# Patient Record
Sex: FEMALE | URBAN - METROPOLITAN AREA
[De-identification: names, ages, dates, MRNs, and addresses within clinical notes are randomized per-mention and may not be internally consistent; named-entity substitution may affect disease eponyms.]

---

## 2020-08-21 ENCOUNTER — APPOINTMENT (RX ONLY)
Dept: URBAN - METROPOLITAN AREA CLINIC 95 | Facility: CLINIC | Age: 70
Setting detail: DERMATOLOGY
End: 2020-08-21

## 2020-08-21 DIAGNOSIS — D485 NEOPLASM OF UNCERTAIN BEHAVIOR OF SKIN: ICD-10-CM

## 2020-08-21 PROBLEM — D48.5 NEOPLASM OF UNCERTAIN BEHAVIOR OF SKIN: Status: ACTIVE | Noted: 2020-08-21

## 2020-08-21 PROCEDURE — ? COUNSELING

## 2020-08-21 PROCEDURE — ? ADDITIONAL NOTES

## 2020-08-21 PROCEDURE — 99201: CPT

## 2020-08-21 PROCEDURE — ? PHOTO-DOCUMENTATION

## 2020-08-21 ASSESSMENT — LOCATION DETAILED DESCRIPTION DERM
LOCATION DETAILED: LEFT UPPER CUTANEOUS LIP
LOCATION DETAILED: PERIUNGUAL SKIN RIGHT THUMB
LOCATION DETAILED: RIGHT INDEX FINGERTIP
LOCATION DETAILED: GENITALIA
LOCATION DETAILED: LEFT MEDIAL BUTTOCK
LOCATION DETAILED: LEFT INDEX FINGERTIP
LOCATION DETAILED: RIGHT BUTTOCK

## 2020-08-21 ASSESSMENT — LOCATION SIMPLE DESCRIPTION DERM
LOCATION SIMPLE: LEFT LIP
LOCATION SIMPLE: LEFT BUTTOCK
LOCATION SIMPLE: GENITALIA
LOCATION SIMPLE: LEFT INDEX FINGER
LOCATION SIMPLE: RIGHT THUMB
LOCATION SIMPLE: RIGHT INDEX FINGER
LOCATION SIMPLE: RIGHT BUTTOCK

## 2020-08-21 ASSESSMENT — PAIN INTENSITY VAS: HOW INTENSE IS YOUR PAIN 0 BEING NO PAIN, 10 BEING THE MOST SEVERE PAIN POSSIBLE?: 5/10 PAIN

## 2020-08-21 ASSESSMENT — LOCATION ZONE DERM
LOCATION ZONE: FINGER
LOCATION ZONE: FINGER
LOCATION ZONE: TRUNK
LOCATION ZONE: LIP

## 2020-08-21 NOTE — PROCEDURE: ADDITIONAL NOTES
Detail Level: Simple
Additional Notes: Patient shows severe skin irritation in affected areas with dark spots and blistering. After examination patient shows necratic fingers with clotting. Patient has painful open wounds on tips of fingers, buttocks, vaginal area, and vermillion lip. Patient has history of kidney transplant. Recommended patient to see her renal specialists. Made call to [facility], made appointment for Monday august 24th. They will refer her to vascular and wound care. Patient seen with Dr Sarah Sheth

## 2020-08-21 NOTE — PROCEDURE: MIPS QUALITY
Quality 402: Tobacco Use And Help With Quitting Among Adolescents: Patient screened for tobacco and is an ex-smoker
Quality 111:Pneumonia Vaccination Status For Older Adults: Pneumococcal Vaccination Previously Received
Detail Level: Detailed
Quality 130: Documentation Of Current Medications In The Medical Record: Current Medications Documented
Quality 110: Preventive Care And Screening: Influenza Immunization: Influenza Immunization previously received during influenza season
Quality 226: Preventive Care And Screening: Tobacco Use: Screening And Cessation Intervention: Patient screened for tobacco use and is an ex/non-smoker